# Patient Record
Sex: FEMALE | Race: WHITE | NOT HISPANIC OR LATINO | Employment: FULL TIME | URBAN - METROPOLITAN AREA
[De-identification: names, ages, dates, MRNs, and addresses within clinical notes are randomized per-mention and may not be internally consistent; named-entity substitution may affect disease eponyms.]

---

## 2019-06-19 ENCOUNTER — OFFICE VISIT CONVERTED (OUTPATIENT)
Dept: FAMILY MEDICINE CLINIC | Facility: CLINIC | Age: 24
End: 2019-06-19
Attending: NURSE PRACTITIONER

## 2019-06-25 ENCOUNTER — HOSPITAL ENCOUNTER (OUTPATIENT)
Dept: LAB | Facility: HOSPITAL | Age: 24
Discharge: HOME | End: 2019-06-25
Attending: NURSE PRACTITIONER

## 2019-06-25 LAB
ALBUMIN SERPL-MCNC: 4.8 G/DL (ref 3.5–5)
ALBUMIN/GLOB SERPL: 1.7 {RATIO} (ref 1.4–2.6)
ALP SERPL-CCNC: 60 U/L (ref 42–98)
ALT SERPL-CCNC: 11 U/L (ref 10–40)
ANION GAP SERPL CALC-SCNC: 16 MMOL/L (ref 8–19)
AST SERPL-CCNC: 17 U/L (ref 15–50)
BASOPHILS # BLD AUTO: 0.06 10*3/UL (ref 0–0.2)
BASOPHILS NFR BLD AUTO: 0.6 % (ref 0–3)
BILIRUB SERPL-MCNC: 0.6 MG/DL (ref 0.2–1.3)
BUN SERPL-MCNC: 11 MG/DL (ref 5–25)
BUN/CREAT SERPL: 11 {RATIO} (ref 6–20)
CALCIUM SERPL-MCNC: 9.2 MG/DL (ref 8.7–10.4)
CHLORIDE SERPL-SCNC: 104 MMOL/L (ref 99–111)
CHOLEST SERPL-MCNC: 170 MG/DL (ref 107–200)
CHOLEST/HDLC SERPL: 3.2 {RATIO} (ref 3–6)
CONV ABS IMM GRAN: 0.02 10*3/UL (ref 0–0.2)
CONV CO2: 24 MMOL/L (ref 22–32)
CONV IMMATURE GRAN: 0.2 % (ref 0–1.8)
CONV TOTAL PROTEIN: 7.6 G/DL (ref 6.3–8.2)
CREAT UR-MCNC: 1.01 MG/DL (ref 0.5–0.9)
DEPRECATED RDW RBC AUTO: 39.8 FL (ref 36.4–46.3)
EOSINOPHIL # BLD AUTO: 0.08 10*3/UL (ref 0–0.7)
EOSINOPHIL # BLD AUTO: 0.8 % (ref 0–7)
ERYTHROCYTE [DISTWIDTH] IN BLOOD BY AUTOMATED COUNT: 12.1 % (ref 11.7–14.4)
GFR SERPLBLD BASED ON 1.73 SQ M-ARVRAT: >60 ML/MIN/{1.73_M2}
GLOBULIN UR ELPH-MCNC: 2.8 G/DL (ref 2–3.5)
GLUCOSE SERPL-MCNC: 84 MG/DL (ref 65–99)
HBA1C MFR BLD: 14.3 G/DL (ref 12–16)
HCT VFR BLD AUTO: 42.9 % (ref 37–47)
HDLC SERPL-MCNC: 53 MG/DL (ref 40–60)
LDLC SERPL CALC-MCNC: 102 MG/DL (ref 70–100)
LYMPHOCYTES # BLD AUTO: 1.9 10*3/UL (ref 1–5)
MCH RBC QN AUTO: 30.2 PG (ref 27–31)
MCHC RBC AUTO-ENTMCNC: 33.3 G/DL (ref 33–37)
MCV RBC AUTO: 90.5 FL (ref 81–99)
MONOCYTES # BLD AUTO: 0.63 10*3/UL (ref 0.2–1.2)
MONOCYTES NFR BLD AUTO: 6.5 % (ref 3–10)
NEUTROPHILS # BLD AUTO: 7.01 10*3/UL (ref 2–8)
NEUTROPHILS NFR BLD AUTO: 72.3 % (ref 30–85)
NRBC CBCN: 0 % (ref 0–0.7)
OSMOLALITY SERPL CALC.SUM OF ELEC: 289 MOSM/KG (ref 273–304)
PLATELET # BLD AUTO: 293 10*3/UL (ref 130–400)
PMV BLD AUTO: 11.2 FL (ref 9.4–12.3)
POTASSIUM SERPL-SCNC: 4.4 MMOL/L (ref 3.5–5.3)
RBC # BLD AUTO: 4.74 10*6/UL (ref 4.2–5.4)
SODIUM SERPL-SCNC: 140 MMOL/L (ref 135–147)
T4 FREE SERPL-MCNC: 1.3 NG/DL (ref 0.9–1.8)
TRIGL SERPL-MCNC: 73 MG/DL (ref 40–150)
TSH SERPL-ACNC: 2.38 M[IU]/L (ref 0.27–4.2)
VARIANT LYMPHS NFR BLD MANUAL: 19.6 % (ref 20–45)
VLDLC SERPL-MCNC: 15 MG/DL (ref 5–37)
WBC # BLD AUTO: 9.7 10*3/UL (ref 4.8–10.8)

## 2020-02-20 ENCOUNTER — HOSPITAL ENCOUNTER (OUTPATIENT)
Dept: URGENT CARE | Facility: CLINIC | Age: 25
Discharge: HOME | End: 2020-02-20
Attending: PHYSICIAN ASSISTANT

## 2020-02-22 LAB — BACTERIA SPEC AEROBE CULT: NORMAL

## 2020-07-10 ENCOUNTER — HOSPITAL ENCOUNTER (OUTPATIENT)
Dept: LABOR AND DELIVERY | Facility: HOSPITAL | Age: 25
Discharge: HOME | End: 2020-07-10
Attending: OBSTETRICS & GYNECOLOGY

## 2020-07-10 LAB
ALBUMIN SERPL-MCNC: 3.6 G/DL (ref 3.5–5)
ALBUMIN/GLOB SERPL: 1.5 {RATIO} (ref 1.4–2.6)
ALP SERPL-CCNC: 66 U/L (ref 42–98)
ALT SERPL-CCNC: 24 U/L (ref 10–40)
ANION GAP SERPL CALC-SCNC: 15 MMOL/L (ref 8–19)
AST SERPL-CCNC: 21 U/L (ref 15–50)
BASOPHILS # BLD AUTO: 0.04 10*3/UL (ref 0–0.2)
BASOPHILS NFR BLD AUTO: 0.3 % (ref 0–3)
BILIRUB SERPL-MCNC: 0.25 MG/DL (ref 0.2–1.3)
BUN SERPL-MCNC: 9 MG/DL (ref 5–25)
BUN/CREAT SERPL: 12 {RATIO} (ref 6–20)
CALCIUM SERPL-MCNC: 8.8 MG/DL (ref 8.7–10.4)
CHLORIDE SERPL-SCNC: 108 MMOL/L (ref 99–111)
CONV ABS IMM GRAN: 0.06 10*3/UL (ref 0–0.2)
CONV CO2: 17 MMOL/L (ref 22–32)
CONV CREATININE URINE, RANDOM: 162 MG/DL (ref 10–300)
CONV IMMATURE GRAN: 0.5 % (ref 0–1.8)
CONV PROTEIN TO CREATININE RATIO (RANDOM URINE): 0.41 {RATIO} (ref 0–0.1)
CONV TOTAL PROTEIN: 6 G/DL (ref 6.3–8.2)
CREAT UR-MCNC: 0.73 MG/DL (ref 0.5–0.9)
DEPRECATED RDW RBC AUTO: 41.6 FL (ref 36.4–46.3)
EOSINOPHIL # BLD AUTO: 0.07 10*3/UL (ref 0–0.7)
EOSINOPHIL # BLD AUTO: 0.6 % (ref 0–7)
ERYTHROCYTE [DISTWIDTH] IN BLOOD BY AUTOMATED COUNT: 12.6 % (ref 11.7–14.4)
GFR SERPLBLD BASED ON 1.73 SQ M-ARVRAT: >60 ML/MIN/{1.73_M2}
GLOBULIN UR ELPH-MCNC: 2.4 G/DL (ref 2–3.5)
GLUCOSE SERPL-MCNC: 73 MG/DL (ref 65–99)
HCT VFR BLD AUTO: 38.5 % (ref 37–47)
HGB BLD-MCNC: 12.7 G/DL (ref 12–16)
LYMPHOCYTES # BLD AUTO: 1.98 10*3/UL (ref 1–5)
LYMPHOCYTES NFR BLD AUTO: 16 % (ref 20–45)
MCH RBC QN AUTO: 29.5 PG (ref 27–31)
MCHC RBC AUTO-ENTMCNC: 33 G/DL (ref 33–37)
MCV RBC AUTO: 89.3 FL (ref 81–99)
MONOCYTES # BLD AUTO: 0.78 10*3/UL (ref 0.2–1.2)
MONOCYTES NFR BLD AUTO: 6.3 % (ref 3–10)
NEUTROPHILS # BLD AUTO: 9.45 10*3/UL (ref 2–8)
NEUTROPHILS NFR BLD AUTO: 76.3 % (ref 30–85)
NRBC CBCN: 0 % (ref 0–0.7)
OSMOLALITY SERPL CALC.SUM OF ELEC: 279 MOSM/KG (ref 273–304)
PLATELET # BLD AUTO: 181 10*3/UL (ref 130–400)
PMV BLD AUTO: 12.2 FL (ref 9.4–12.3)
POTASSIUM SERPL-SCNC: 3.7 MMOL/L (ref 3.5–5.3)
PROT UR-MCNC: 66.3 MG/DL
RBC # BLD AUTO: 4.31 10*6/UL (ref 4.2–5.4)
SODIUM SERPL-SCNC: 136 MMOL/L (ref 135–147)
WBC # BLD AUTO: 12.38 10*3/UL (ref 4.8–10.8)

## 2020-10-15 LAB — HM PAP SMEAR: NORMAL

## 2020-12-09 ENCOUNTER — OFFICE VISIT CONVERTED (OUTPATIENT)
Dept: FAMILY MEDICINE CLINIC | Facility: CLINIC | Age: 25
End: 2020-12-09
Attending: NURSE PRACTITIONER

## 2021-01-19 ENCOUNTER — HOSPITAL ENCOUNTER (OUTPATIENT)
Dept: LAB | Facility: HOSPITAL | Age: 26
Discharge: HOME | End: 2021-01-19
Attending: NURSE PRACTITIONER

## 2021-01-19 ENCOUNTER — OFFICE VISIT CONVERTED (OUTPATIENT)
Dept: FAMILY MEDICINE CLINIC | Facility: CLINIC | Age: 26
End: 2021-01-19
Attending: NURSE PRACTITIONER

## 2021-01-19 LAB
ALBUMIN SERPL-MCNC: 4.7 G/DL (ref 3.5–5)
ALBUMIN/GLOB SERPL: 1.6 {RATIO} (ref 1.4–2.6)
ALP SERPL-CCNC: 75 U/L (ref 42–98)
ALT SERPL-CCNC: 10 U/L (ref 10–40)
ANION GAP SERPL CALC-SCNC: 15 MMOL/L (ref 8–19)
AST SERPL-CCNC: 15 U/L (ref 15–50)
BASOPHILS # BLD AUTO: 0.06 10*3/UL (ref 0–0.2)
BASOPHILS NFR BLD AUTO: 0.6 % (ref 0–3)
BILIRUB SERPL-MCNC: 0.47 MG/DL (ref 0.2–1.3)
BUN SERPL-MCNC: 13 MG/DL (ref 5–25)
BUN/CREAT SERPL: 14 {RATIO} (ref 6–20)
CALCIUM SERPL-MCNC: 9.6 MG/DL (ref 8.7–10.4)
CHLORIDE SERPL-SCNC: 103 MMOL/L (ref 99–111)
CONV ABS IMM GRAN: 0.03 10*3/UL (ref 0–0.2)
CONV CO2: 23 MMOL/L (ref 22–32)
CONV IMMATURE GRAN: 0.3 % (ref 0–1.8)
CONV TOTAL PROTEIN: 7.7 G/DL (ref 6.3–8.2)
CREAT UR-MCNC: 0.92 MG/DL (ref 0.5–0.9)
DEPRECATED RDW RBC AUTO: 38.7 FL (ref 36.4–46.3)
EOSINOPHIL # BLD AUTO: 0.1 10*3/UL (ref 0–0.7)
EOSINOPHIL # BLD AUTO: 1 % (ref 0–7)
ERYTHROCYTE [DISTWIDTH] IN BLOOD BY AUTOMATED COUNT: 12 % (ref 11.7–14.4)
GFR SERPLBLD BASED ON 1.73 SQ M-ARVRAT: >60 ML/MIN/{1.73_M2}
GLOBULIN UR ELPH-MCNC: 3 G/DL (ref 2–3.5)
GLUCOSE SERPL-MCNC: 87 MG/DL (ref 65–99)
HCT VFR BLD AUTO: 42.9 % (ref 37–47)
HGB BLD-MCNC: 14.1 G/DL (ref 12–16)
IRON SATN MFR SERPL: 29 % (ref 20–55)
IRON SERPL-MCNC: 108 UG/DL (ref 60–170)
LYMPHOCYTES # BLD AUTO: 2.63 10*3/UL (ref 1–5)
LYMPHOCYTES NFR BLD AUTO: 25.7 % (ref 20–45)
MCH RBC QN AUTO: 29.1 PG (ref 27–31)
MCHC RBC AUTO-ENTMCNC: 32.9 G/DL (ref 33–37)
MCV RBC AUTO: 88.5 FL (ref 81–99)
MONOCYTES # BLD AUTO: 0.63 10*3/UL (ref 0.2–1.2)
MONOCYTES NFR BLD AUTO: 6.2 % (ref 3–10)
NEUTROPHILS # BLD AUTO: 6.77 10*3/UL (ref 2–8)
NEUTROPHILS NFR BLD AUTO: 66.2 % (ref 30–85)
NRBC CBCN: 0 % (ref 0–0.7)
OSMOLALITY SERPL CALC.SUM OF ELEC: 283 MOSM/KG (ref 273–304)
PLATELET # BLD AUTO: 286 10*3/UL (ref 130–400)
PMV BLD AUTO: 10.8 FL (ref 9.4–12.3)
POTASSIUM SERPL-SCNC: 3.8 MMOL/L (ref 3.5–5.3)
RBC # BLD AUTO: 4.85 10*6/UL (ref 4.2–5.4)
SODIUM SERPL-SCNC: 137 MMOL/L (ref 135–147)
TIBC SERPL-MCNC: 369 UG/DL (ref 245–450)
TRANSFERRIN SERPL-MCNC: 258 MG/DL (ref 250–380)
TSH SERPL-ACNC: 3.73 M[IU]/L (ref 0.27–4.2)
VIT B12 SERPL-MCNC: 308 PG/ML (ref 211–911)
WBC # BLD AUTO: 10.22 10*3/UL (ref 4.8–10.8)

## 2021-04-27 ENCOUNTER — OFFICE VISIT CONVERTED (OUTPATIENT)
Dept: FAMILY MEDICINE CLINIC | Facility: CLINIC | Age: 26
End: 2021-04-27
Attending: NURSE PRACTITIONER

## 2021-04-30 ENCOUNTER — HOSPITAL ENCOUNTER (OUTPATIENT)
Dept: URGENT CARE | Facility: CLINIC | Age: 26
Discharge: HOME | End: 2021-04-30
Attending: NURSE PRACTITIONER

## 2021-05-03 LAB
BACTERIA UR CULT: ABNORMAL
CIPROFLOXACIN SUSC ISLT: <=0.5
DAPTOMYCIN SUSC ISLT: <=0.12
DOXYCYCLINE SUSC ISLT: <=0.5
ERYTHROMYCIN SUSC ISLT: >=8
GENTAMICIN SUSC ISLT: <=0.5
LEVOFLOXACIN SUSC ISLT: 1
NITROFURANTOIN SUSC ISLT: <=16
OXACILLIN SUSC ISLT: 2
RIFAMPIN SUSC ISLT: <=0.5
TETRACYCLINE SUSC ISLT: <=1
TIGECYCLINE SUSC ISLT: <=0.12
TMP SMX SUSC ISLT: <=10
VANCOMYCIN SUSC ISLT: <=0.5

## 2021-05-10 NOTE — H&P
History and Physical      Patient Name: Mery Sanchez   Patient ID: [id number]   Sex: Female   YOB: 1995    Primary Care Provider: Mejia NICOLE    Visit Date: January 19, 2021    Provider: BONNIE Romero   Location: [facility] [place]   Location Address: [address] [place] [address], [address]  [id number]   Location Phone: [phone]          Chief Complaint  · Establish Care  · Headaches with sharp pains in the back of her head      History Of Present Illness  Mery Sanchez is a 25 year old /White female who presents for evaluation and treatment of:      Presents to the office today to establish care.  Patient states that she has been having episodes of elevated blood pressure.  Blood pressure arrival today is 134/90.  She does state that she has been having frequent headaches as well as dizziness.  Patient states that the headaches occur on a daily basis.  She states that they are typically mild but has more severe ones approximately twice a week.  She denies any nausea vomiting.  She does state that she frequently has photophobia.  Patient currently right now is only using Tylenol and caffeine to help treat the aches.  Also states that she has been dealing with mild anxiety since the birth of her child 6 months ago.  Patient states that her child was born 3 months early and it being her first child, she finds her self getting anxious on regular basis.  Patient states that she is currently seeing a GYN in [facility].  She does state its been a while since she has had routine lab work.           Past Medical History  Disease Name Date Onset Notes   ***No Significant Medical History --  --          Past Surgical History  Procedure Name Date Notes   MENISCUS TEAR 2008, 2010 Right    Rotator Cuff repair 2012 Left         Allergy List  Allergen Name Date Reaction Notes   NO KNOWN DRUG ALLERGIES --  --  --          Family Medical History  Disease  Name Relative/Age Notes   Prostate cancer Grandfather (paternal)/   Grandfather (paternal)   Family history of breast cancer Grandmother (maternal)/40s  Mother/30s   Mother/30s; Grandmother (maternal)/40s   Family history of breast cancer in mother  --          Reproductive History  Menstrual   Age Menarche: 14   Pregnancy Summary   Total Pregnancies: 0 Full Term: 0 Premature: 0   Ab Induced: 0 Ab Spontaneous: 0 Ectopics: 0   Multiples: 0 Livin         Social History  Finding Status Start/Stop Quantity Notes   Alcohol Current some day --/-- --  drinks monthly; wine, beer and liquor   Caffeine Current some day --/-- --  drinks occasionally; tea; 1-2 times per day   Exercises 3 to 4 times per week --  --/-- --  --     --  --/-- --  --    Second hand smoke exposure Never --/-- --  no   Tobacco Never --/-- --  never a smoker         Immunizations  NameDate Admin Mfg Trade Name Lot Number Route Inj VIS Given VIS Publication   [id number]/01/2019 SKB Fluzone [employer]  NE NE 2019    Comments:          Review of Systems  · Constitutional  o Admits  o : fatigue  o Denies  o : night sweats  · Eyes  o Denies  o : double vision, blurred vision  · HENT  o Denies  o : vertigo, recent head injury  · Breasts  o Denies  o : abnormal changes in breast size, additional breast symptoms except as noted in the HPI  · Cardiovascular  o Denies  o : chest pain, irregular heart beats  · Respiratory  o Denies  o : shortness of breath, productive cough  · Gastrointestinal  o Denies  o : nausea, vomiting  · Genitourinary  o Denies  o : dysuria, urinary retention  · Integument  o Denies  o : hair growth change, new skin lesions  · Musculoskeletal  o Denies  o : joint swelling, limitation of motion  · Endocrine  o Denies  o : cold intolerance, heat intolerance  · Heme-Lymph  o Denies  o : petechiae, lymph node enlargement or tenderness  · Allergic-Immunologic  o Denies  o : frequent illnesses      Vitals  Date Time BP  "Position Site L\R Cuff Size HR RR TEMP (F) WT  HT  BMI kg/m2 BSA m2 O2 Sat FR L/min FiO2 HC       01/19/2021 10:39 /90 Sitting    103 - R  97.8 198lbs 0oz 5'  4\" 33.99 2.01 98 %            Physical Examination  · Constitutional  o Appearance  o : well-nourished, in no acute distress  · Neck  o Inspection/Palpation  o : normal appearance, no masses or tenderness, trachea midline  o Range of Motion  o : cervical range of motion within normal limits  o Thyroid  o : gland size normal, nontender, no nodules or masses present on palpation  · Respiratory  o Respiratory Effort  o : breathing unlabored  o Inspection of Chest  o : normal appearance  o Auscultation of Lungs  o : normal breath sounds throughout inspiration and expiration  · Cardiovascular  o Heart  o :   § Auscultation of Heart  § : regular rate and rhythm, no murmurs, gallops or rubs  o Peripheral Vascular System  o :   § Extremities  § : no clubbing or edema  · Skin and Subcutaneous Tissue  o General Inspection  o : no rashes or lesions present, no areas of discoloration  o Body Hair  o : hair normal for age, general body hair distribution normal for age  o Digits and Nails  o : no clubbing, cyanosis, deformities or edema present, normal appearing nails  · Neurologic  o Mental Status Examination  o :   § Orientation  § : grossly oriented to person, place and time  o Gait and Station  o : normal gait, able to stand without difficulty  · Psychiatric  o Judgement and Insight  o : judgment and insight intact  o Mood and Affect  o : mood normal, affect appropriate  o Presence of Abnormal Thoughts  o : no hallucinations, no delusions present, no psychotic thoughts          Assessment  · Anxiety disorder     300.00/F41.9  · Essential hypertension     401.9/I10  · Fatigue     780.79/R53.83  · Migraine     346.90/G43.909  · Establishing care with new doctor, encounter for     V65.8/Z76.89      Plan  · Orders  o Female Fatigue Panel (CBC, CMP, TSH, B12) [facility] " "(36391, 77662, 70615, 70677) - 780.79/R53.83 - 01/19/2021  o Iron panel (iron, TIBC, transferrin saturation) (92905, 99672, 35922) - 780.79/R53.83 - 01/19/2021  o ACO-39: Current medications updated and reviewed (, 1159F) - - 01/19/2021  · Medications  o propranolol 20 mg oral tablet   SIG: take 1 tablet by oral route 2 times a day for 90 days   DISP: (180) Tablet with 0 refills  Adjusted on 01/19/2021     o Medications have been Reconciled  o Transition of Care or Provider Policy  · Instructions  o Discussed the need for therapy, either with a certified counselor, psychologist, and/or family . If no improvement is noted or worsening of their condition, return to office or ER. But also discussed with patient that if they are non-responsive to the type of medication they may need to see a psychiatrist for further evaluation and management.  o Patient was given an SSRI/SSNRI medication and warned of possible side effects of the medication including potential for increased risk of suicidal thoughts and feelings. Patient was instructed that if they begin to exhibit any of these effects they will discontinue the medication immediately and contact our office or the ER ASAP.  o Patient agrees to a \"No Self Harm\" contract. Patient will either call us, [facility], ER, [facility], [facility].  o Patient advised to monitor blood pressure (B/P) at home and journal readings. Patient informed that a B/P reading at home of more than 130/80 is considered hypertension. For readings greater dqtd888/90 or higher patient is advised to follow up in the office with readings for management. Patient advised to limit sodium intake.  o Patient was educated/instructed on their diagnosis, treatment and medications prior to discharge from the clinic today.  o Minutes spent with patient including greater than 50% in Education/Counseling/Care Coordination.  o Time spent with the patient was minutes, more than " 50% face to face.  o Electronically Identified Patient Education Materials Provided Electronically  · Disposition  o Call or Return if symptoms worsen or persist.  o Follow up in 3 months            Electronically Signed by: BONNIE Romero -Author on January 19, 2021 12:56:12 PM

## 2021-05-13 NOTE — PROGRESS NOTES
Progress Note      Patient Name: Mery Sanchez   Patient ID: [id number]   Sex: Female   YOB: 1995    Primary Care Provider: Anjali NICOLE   Referring Provider: Anjali NICOLE    Visit Date: 2020    Provider: BONNIE Stafford   Location: [facility]   Location Address: [address] [place] [address], [address]  [place], [place]  [id number]   Location Phone: [phone]          Chief Complaint  · Blood pressure and headaches      History Of Present Illness  Mery Sanchez is a 25 year old /White female who presents for evaluation and treatment of:      Pt is having elevated blood pressure and HA. She did have blood pressure issues and preeclampsia with her last pregnancy. She declines depression but states she has a lot of anxiety.    Pt has pap smears at [facility] with Dr. Felix.       Past Medical History  Disease Name Date Onset Notes   ***No Significant Medical History --  --          Past Surgical History  Procedure Name Date Notes   MENISCUS TEAR ,  Right    Rotator Cuff repair  Left         Allergy List  Allergen Name Date Reaction Notes   NO KNOWN DRUG ALLERGIES --  --  --          Family Medical History  Disease Name Relative/Age Notes   Prostate cancer Grandfather (paternal)/   Grandfather (paternal)   Family history of breast cancer Grandmother (maternal)/40s  Mother/30s   Mother/30s; Grandmother (maternal)/40s   Family history of breast cancer in mother  --          Reproductive History  Menstrual   Age Menarche: 14   Pregnancy Summary   Total Pregnancies: 0 Full Term: 0 Premature: 0   Ab Induced: 0 Ab Spontaneous: 0 Ectopics: 0   Multiples: 0 Livin         Social History  Finding Status Start/Stop Quantity Notes   Alcohol Current some day --/-- --  drinks monthly; wine, beer and liquor   Caffeine Current some day --/-- --  drinks occasionally; tea; 1-2 times per day   Exercises 3 to 4 times per week --  --/-- --  --      "--  --/-- --  --    Second hand smoke exposure Never --/-- --  no   Tobacco Never --/-- --  never a smoker         Immunizations  NameDate Admin Mfg Trade Name Lot Number Route Inj VIS Given VIS Publication   [id number]/01/2019 SKB Fluzone [employer]  NE NE 06/19/2019    Comments:          Review of Systems  · Constitutional  o Denies  o : fever, fatigue, weight loss, weight gain  · HENT  o Admits  o : headaches  · Cardiovascular  o Denies  o : lower extremity edema, claudication, chest pressure, palpitations  · Respiratory  o Denies  o : shortness of breath, wheezing, cough, hemoptysis, dyspnea on exertion  · Gastrointestinal  o Denies  o : nausea, vomiting, diarrhea, constipation, abdominal pain  · Psychiatric  o Admits  o : anxiety      Vitals  Date Time BP Position Site L\R Cuff Size HR RR TEMP (F) WT  HT  BMI kg/m2 BSA m2 O2 Sat FR L/min FiO2 HC       06/19/2019 02:49 /81 Sitting    88 - R   189lbs 0oz 5'  6\" 30.51 2 98 %      12/09/2020 03:44 /99 Sitting    104 - R   201lbs 6oz 5'  4\" 34.57 2.03 98 %            Physical Examination  · Constitutional  o Appearance  o : well-nourished, well developed, alert, in no acute distress  · Respiratory  o Respiratory Effort  o : breathing unlabored  o Auscultation of Lungs  o : normal breath sounds throughout  · Cardiovascular  o Heart  o :   § Auscultation of Heart  § : regular rate and rhythm, no murmurs, gallops or rubs  § Palpation of Heart  § : normal apical impulse, no cardiac thrill present  o Peripheral Vascular System  o :   § Extremities  § : no cyanosis, clubbing or edema; less than 2 second refill noted  · Gastrointestinal  o Abdominal Examination  o : abdomen nontender to palpation, tone normal without rigidity or guarding, no masses present, abdominal contour scaphoid  o Liver and spleen  o : no hepatomegaly present, liver nontender to palpation, spleen not palpable  · Skin and Subcutaneous Tissue  o General Inspection  o : no rashes or " "lesions present, no areas of discoloration  · Neurologic  o Mental Status Examination  o :   § Orientation  § : grossly oriented to person, place and time  o Cranial Nerves  o : cranial nerves intact and symmetric throughout  · Psychiatric  o Mood and Affect  o : mood normal, affect appropriate, denies any SI/HI          Assessment  · Anxiety disorder     300.00/F41.9  · Essential hypertension     401.9/I10  · Headache     784.0/R51  · Screening for depression     V79.0/Z13.89    Problems Reconciled  Plan  · Orders  o ACO-18: Negative screen for clinical depression using a standardized tool () - V79.0/Z13.89 - 12/09/2020  o ACO-39: Current medications updated and reviewed (1159F, ) - - 12/09/2020  o ACO-18: Negative screen for clinical depression using a standardized tool () - - 12/09/2020  o ACO-14: Influenza immunization administered or previously received [facility] () - - 12/09/2020  · Medications  o benazepril 10 mg oral tablet   SIG: take 1 tablet (10 mg) by oral route once daily for 30 days   DISP: (30) Tablet with 1 refills  Prescribed on 12/09/2020     o Medications have been Reconciled  o Transition of Care or Provider Policy  · Instructions  o Patient agrees to a \"No Self Harm\" contract. Patient will either call , 1, , [facility], [facility].  o Patient advised to monitor blood pressure (B/P) at home and journal readings. Patient informed that a B/P reading at home of more than 130/80 is considered hypertension. For readings greater kufi533/90 or higher patient is advised to follow up in the office with readings for management. Patient advised to limit sodium intake.  o Depression Screen completed and scanned into the EMR under the designated folder within the patient's documents.  o Today's PHQ-9 result is _6: pt declines depression__  o The provider screening met the required time of 15 minutes.  o Patient was educated/instructed on their diagnosis, " treatment and medications prior to discharge from the clinic today.  o Patient instructed to seek medical attention urgently for new or worsening symptoms.  o Call the office with any concerns or questions.  o advised pt to keep bp log and bring to f/u appt  · Disposition  o Return Visit Request in/on 1 month +/- 2 weeks ([id number]).            Electronically Signed by: BONNIE Stafford -Author on December 9, 2020 03:57:31 PM

## 2021-05-14 VITALS
BODY MASS INDEX: 34.15 KG/M2 | SYSTOLIC BLOOD PRESSURE: 122 MMHG | TEMPERATURE: 97.8 F | HEIGHT: 64 IN | OXYGEN SATURATION: 98 % | WEIGHT: 200 LBS | HEART RATE: 90 BPM | DIASTOLIC BLOOD PRESSURE: 80 MMHG

## 2021-05-14 VITALS
SYSTOLIC BLOOD PRESSURE: 143 MMHG | HEIGHT: 64 IN | OXYGEN SATURATION: 98 % | HEART RATE: 104 BPM | DIASTOLIC BLOOD PRESSURE: 99 MMHG | BODY MASS INDEX: 34.38 KG/M2 | WEIGHT: 201.37 LBS

## 2021-05-14 VITALS
WEIGHT: 198 LBS | SYSTOLIC BLOOD PRESSURE: 134 MMHG | TEMPERATURE: 97.8 F | DIASTOLIC BLOOD PRESSURE: 90 MMHG | HEIGHT: 64 IN | OXYGEN SATURATION: 98 % | BODY MASS INDEX: 33.8 KG/M2 | HEART RATE: 103 BPM

## 2021-05-14 NOTE — PROGRESS NOTES
Progress Note      Patient Name: Mery Sanchez   Patient ID: [id number]   Sex: Female   YOB: 1995    Primary Care Provider: Mejia NICOLE    Visit Date: April 27, 2021    Provider: BONNIE Romero   Location: [facility]   Location Address: [address] [place] [address], [address]  [id number]   Location Phone: [phone]          Chief Complaint  · 3 month follow up       History Of Present Illness  Mery Sanchez is a 26 year old /White female who presents for evaluation and treatment of:      Presents to the office today for 3-month follow-up regarding her migraine headaches.  Patient states that she continues to have these on a daily basis and states that she has severe migraines proximately 2-3 times a week.  She does state that the current medication regimen has lessened the severity of the headaches states that she is still having them.  He also states that she has mild nausea and vomiting occasionally.  Patient states that she has noticed some mild paresthesias in her hands with the topiramate.  She states it is not bothersome but just wanted to let the office know.  I explained to the patient that the side effects become more severe to let us know and we would consider switching the medication to Trokendi that would be less likely to cause side effects       Past Medical History  Disease Name Date Onset Notes   ***No Significant Medical History --  --          Past Surgical History  Procedure Name Date Notes   MENISCUS TEAR 2008, 2010 Right    Rotator Cuff repair 2012 Left         Medication List  Name Date Started Instructions   Imitrex 100 mg oral tablet 03/31/2021 take 1 tablet (100 mg) by oral route after onset of migraine; may repeat after 2 hours PRN not to exceed 200mg in 24hrs   propranolol 20 mg oral tablet 01/19/2021 take 1 tablet by oral route 2 times a day for 90 days   topiramate 25 mg oral tablet 03/31/2021 take 1 tablet by  "oral route 2 times per day in the morning and evening for 30 days         Allergy List  Allergen Name Date Reaction Notes   NO KNOWN DRUG ALLERGIES --  --  --        Allergies Reconciled  Family Medical History  Disease Name Relative/Age Notes   Prostate cancer Grandfather (paternal)/   Grandfather (paternal)   Family history of breast cancer Grandmother (maternal)/40s  Mother/30s   Mother/30s; Grandmother (maternal)/40s   Family history of breast cancer in mother  --          Reproductive History  Menstrual   Age Menarche: 14   Pregnancy Summary   Total Pregnancies: 0 Full Term: 0 Premature: 0   Ab Induced: 0 Ab Spontaneous: 0 Ectopics: 0   Multiples: 0 Livin         Social History  Finding Status Start/Stop Quantity Notes   Alcohol Current some day --/-- --  drinks monthly; wine, beer and liquor   Caffeine Current some day --/-- --  drinks occasionally; tea; 1-2 times per day   Exercises 3 to 4 times per week --  --/-- --  --     --  --/-- --  --    Second hand smoke exposure Never --/-- --  no   Tobacco Never --/-- --  never a smoker         Immunizations  NameDate Admin Mfg Trade Name Lot Number Route Inj VIS Given VIS Publication   [id number]/01/2019 SKB Fluzone [employer]  NE NE 2019    Comments:          Review of Systems  · Constitutional  o Denies  o : fever, fatigue, weight loss, weight gain  · Cardiovascular  o Denies  o : lower extremity edema, claudication, chest pressure, palpitations  · Respiratory  o Denies  o : shortness of breath, wheezing, cough, hemoptysis, dyspnea on exertion  · Gastrointestinal  o Denies  o : nausea, vomiting, diarrhea, constipation, abdominal pain      Vitals  Date Time BP Position Site L\R Cuff Size HR RR TEMP (F) WT  HT  BMI kg/m2 BSA m2 O2 Sat FR L/min FiO2 HC       2021 07:31 /80 Sitting    90 - R  97.8 200lbs 0oz 5'  4\" 34.33 2.02 98 %            Physical Examination  · Constitutional  o Appearance  o : well-nourished, in no acute " distress  · Neck  o Inspection/Palpation  o : normal appearance, no masses or tenderness, trachea midline  o Range of Motion  o : cervical range of motion within normal limits  o Thyroid  o : gland size normal, nontender, no nodules or masses present on palpation  · Respiratory  o Respiratory Effort  o : breathing unlabored  o Inspection of Chest  o : normal appearance  o Auscultation of Lungs  o : normal breath sounds throughout inspiration and expiration  · Cardiovascular  o Heart  o :   § Auscultation of Heart  § : regular rate and rhythm, no murmurs, gallops or rubs  o Peripheral Vascular System  o :   § Extremities  § : no clubbing or edema  · Skin and Subcutaneous Tissue  o General Inspection  o : no rashes or lesions present, no areas of discoloration  o Body Hair  o : hair normal for age, general body hair distribution normal for age  o Digits and Nails  o : no clubbing, cyanosis, deformities or edema present, normal appearing nails  · Neurologic  o Mental Status Examination  o :   § Orientation  § : grossly oriented to person, place and time  o Gait and Station  o : normal gait, able to stand without difficulty  · Psychiatric  o Judgement and Insight  o : judgment and insight intact  o Mood and Affect  o : mood normal, affect appropriate  o Presence of Abnormal Thoughts  o : no hallucinations, no delusions present, no psychotic thoughts          Assessment  · Migraine     346.90/G43.909  · Medication monitoring encounter     V58.83/Z51.81      Plan  · Orders  o ACO-39: Current medications updated and reviewed (, 1159F) - - 04/27/2021  · Medications  o topiramate 50 mg oral tablet   SIG: take 1 tablet by oral route 2 times a day for 30 days   DISP: (60) Tablet with 2 refills  Adjusted on 04/27/2021     o Medications have been Reconciled  o Transition of Care or Provider Policy  · Instructions  o Patient was educated/instructed on their diagnosis, treatment and medications prior to discharge from the  clinic today.  o Minutes spent with patient including greater than 50% in Education/Counseling/Care Coordination.  o Time spent with the patient was minutes, more than 50% face to face.  o Electronically Identified Patient Education Materials Provided Electronically  · Disposition  o Call or Return if symptoms worsen or persist.  o Follow up in 3 months            Electronically Signed by: BONNIE Romero -Author on April 27, 2021 07:45:19 AM

## 2021-05-15 VITALS
BODY MASS INDEX: 30.37 KG/M2 | HEIGHT: 66 IN | WEIGHT: 189 LBS | SYSTOLIC BLOOD PRESSURE: 129 MMHG | DIASTOLIC BLOOD PRESSURE: 81 MMHG | HEART RATE: 88 BPM | OXYGEN SATURATION: 98 %

## 2021-07-28 ENCOUNTER — OFFICE VISIT (OUTPATIENT)
Dept: FAMILY MEDICINE CLINIC | Facility: CLINIC | Age: 26
End: 2021-07-28

## 2021-07-28 VITALS
BODY MASS INDEX: 34.31 KG/M2 | HEART RATE: 99 BPM | HEIGHT: 64 IN | WEIGHT: 201 LBS | OXYGEN SATURATION: 99 % | SYSTOLIC BLOOD PRESSURE: 126 MMHG | DIASTOLIC BLOOD PRESSURE: 80 MMHG | TEMPERATURE: 97.5 F

## 2021-07-28 DIAGNOSIS — G43.911 INTRACTABLE MIGRAINE WITH STATUS MIGRAINOSUS, UNSPECIFIED MIGRAINE TYPE: ICD-10-CM

## 2021-07-28 DIAGNOSIS — R53.83 FATIGUE, UNSPECIFIED TYPE: ICD-10-CM

## 2021-07-28 DIAGNOSIS — Z80.3 FAMILY HISTORY OF BREAST CANCER IN MOTHER: Primary | ICD-10-CM

## 2021-07-28 DIAGNOSIS — N63.0 BREAST NODULE: ICD-10-CM

## 2021-07-28 PROCEDURE — 99213 OFFICE O/P EST LOW 20 MIN: CPT | Performed by: NURSE PRACTITIONER

## 2021-07-28 RX ORDER — SUMATRIPTAN 100 MG/1
TABLET, FILM COATED ORAL
COMMUNITY
Start: 2021-05-01 | End: 2021-08-10 | Stop reason: ALTCHOICE

## 2021-07-28 NOTE — PROGRESS NOTES
"Chief Complaint  Follow-up (Headaches) and Fatigue    Subjective          Mery Sanchez presents to [facility]  Patient presents to the office today to discuss her migraines.  Patient was taken off topiramate and placed on Trokendi due to unfavorable side effects.  Patient states that her headaches have seemed to got worse.  I did discuss starting patient on Nurtec.  Samples were given to the patient to start.  Patient also has concerns of breast nodules in her left breast.  Patient does state that her mom was diagnosed with breast cancer in her 30s.  He states that the nodule is very tender.  She does state that she has noticed some nipple discharge although she discontinued breast-feeding a year ago.  She denies any changes in her skin or dimpling of the breast.      Objective   Vital Signs:   /80 (BP Location: Left arm, Patient Position: Sitting, Cuff Size: Adult)   Pulse 99   Temp 97.5 °F (36.4 °C) (Temporal)   Ht 162.6 cm (64\")   Wt 91.2 kg (201 lb)   SpO2 99%   BMI 34.50 kg/m²     Physical Exam  Exam conducted with a chaperone present.   Constitutional:       Appearance: Normal appearance.   Cardiovascular:      Rate and Rhythm: Normal rate and regular rhythm.      Heart sounds: Normal heart sounds, S1 normal and S2 normal. No murmur heard.     Pulmonary:      Effort: Pulmonary effort is normal. No respiratory distress.      Breath sounds: Normal breath sounds.   Chest:      Breasts:         Right: Normal.         Left: Mass and tenderness present. No swelling, inverted nipple, nipple discharge or skin change.       Lymphadenopathy:      Upper Body:      Right upper body: No supraclavicular or axillary adenopathy.      Left upper body: No supraclavicular or axillary adenopathy.   Skin:     General: Skin is warm and dry.   Neurological:      Mental Status: She is alert and oriented to person, place, and time.   Psychiatric:         Attention and Perception: " Attention normal.         Mood and Affect: Mood normal.         Behavior: Behavior normal.        Result Review :                Assessment and Plan    Diagnoses and all orders for this visit:    1. Family history of breast cancer in mother (Primary)  -     Mammo Screening Digital Tomosynthesis Bilateral With CAD; Future    2. Breast nodule  Comments:  left breast 11 oclock   Orders:  -     Mammo Screening Digital Tomosynthesis Bilateral With CAD; Future    3. Intractable migraine with status migrainosus, unspecified migraine type  Comments:  Nurtec 75 mg p.o. daily as needed.    4. Fatigue, unspecified type        Follow Up   No follow-ups on file.  Patient was given instructions and counseling regarding her condition or for health maintenance advice. Please see specific information pulled into the AVS if appropriate.

## 2021-08-03 ENCOUNTER — TELEPHONE (OUTPATIENT)
Dept: FAMILY MEDICINE CLINIC | Facility: CLINIC | Age: 26
End: 2021-08-03

## 2021-08-03 NOTE — TELEPHONE ENCOUNTER
Pt went in for her mammogram today and was turned away because the order was wrong. Order placed was for a screening mammo, but pt was told that it needed to be a diagnostic mammo with U/S. Pt would like the new order placed so that she can get scheduled as soon as possible.

## 2021-08-09 ENCOUNTER — TELEPHONE (OUTPATIENT)
Dept: FAMILY MEDICINE CLINIC | Facility: CLINIC | Age: 26
End: 2021-08-09

## 2021-08-09 NOTE — TELEPHONE ENCOUNTER
Caller: Mery Sanchez    Relationship: Self    Best call back number:[phone]     What medication are you requesting:NORTIC      Have you had these symptoms before:    [x] Yes  [] No    Have you been treated for these symptoms before:   [x] Yes  [] No    If a prescription is needed, what is your preferred pharmacy and phone number:    [facility] [address], [place] - [address] [facility] AT [facility] [address]  [phone]        Additional notes:PATIENT IS ALSO SUPPOSE TO HAVE A MAMMOGRAM DONE AND WOULD LIKE AN APPOINTMENT SCHEDULED. PLEASE ADVISE PATIENT WHEN COMPLETED.

## 2021-08-10 RX ORDER — RIMEGEPANT SULFATE 75 MG/75MG
75 TABLET, ORALLY DISINTEGRATING ORAL DAILY PRN
Qty: 24 TABLET | Refills: 1 | Status: SHIPPED | OUTPATIENT
Start: 2021-08-10 | End: 2021-09-22

## 2021-08-10 NOTE — TELEPHONE ENCOUNTER
Spoke to pt and she is wanting to start nurtec,  She says you gave her samples and they worked really well.

## 2021-08-16 ENCOUNTER — TELEPHONE (OUTPATIENT)
Dept: FAMILY MEDICINE CLINIC | Facility: CLINIC | Age: 26
End: 2021-08-16

## 2021-08-16 DIAGNOSIS — Z12.31 BREAST CANCER SCREENING BY MAMMOGRAM: Primary | ICD-10-CM

## 2021-08-16 NOTE — TELEPHONE ENCOUNTER
PT CALLED SAYING THAT SHE NEEDS AN ORDER FOR A DIAGNOSTIC MAMMOGRAM ORDER. PT ALSO NEEDING ORDER SAYING US IF NEEDED. SCHEDULING HUB WAITING ON ORDER CHANGE SO THAT THEY CAN SCHEDULE THE PT. THANK YOU

## 2021-08-17 ENCOUNTER — TRANSCRIBE ORDERS (OUTPATIENT)
Dept: ADMINISTRATIVE | Facility: HOSPITAL | Age: 26
End: 2021-08-17

## 2021-08-17 DIAGNOSIS — Z12.39 ENCOUNTER FOR SCREENING FOR MALIGNANT NEOPLASM OF BREAST, UNSPECIFIED SCREENING MODALITY: Primary | ICD-10-CM

## 2021-09-02 ENCOUNTER — HOSPITAL ENCOUNTER (OUTPATIENT)
Dept: ULTRASOUND IMAGING | Facility: HOSPITAL | Age: 26
Discharge: HOME | End: 2021-09-02
Admitting: NURSE PRACTITIONER

## 2021-09-02 DIAGNOSIS — N64.4 BREAST PAIN, LEFT: ICD-10-CM

## 2021-09-02 DIAGNOSIS — Z12.31 BREAST CANCER SCREENING BY MAMMOGRAM: ICD-10-CM

## 2021-09-02 PROCEDURE — 76642 ULTRASOUND BREAST LIMITED: CPT

## 2021-09-22 ENCOUNTER — PROCEDURE VISIT (OUTPATIENT)
Dept: OBSTETRICS AND GYNECOLOGY | Facility: CLINIC | Age: 26
End: 2021-09-22

## 2021-09-22 VITALS — SYSTOLIC BLOOD PRESSURE: 125 MMHG | WEIGHT: 201 LBS | DIASTOLIC BLOOD PRESSURE: 86 MMHG | BODY MASS INDEX: 34.5 KG/M2

## 2021-09-22 DIAGNOSIS — Z31.69 ENCOUNTER FOR PRECONCEPTION CONSULTATION: Primary | ICD-10-CM

## 2021-09-22 DIAGNOSIS — Z30.432 ENCOUNTER FOR REMOVAL OF INTRAUTERINE CONTRACEPTIVE DEVICE (IUD): ICD-10-CM

## 2021-09-22 PROCEDURE — 58301 REMOVE INTRAUTERINE DEVICE: CPT | Performed by: OBSTETRICS & GYNECOLOGY

## 2021-09-22 PROCEDURE — 99213 OFFICE O/P EST LOW 20 MIN: CPT | Performed by: OBSTETRICS & GYNECOLOGY

## 2021-09-22 RX ORDER — RIMEGEPANT SULFATE 75 MG/75MG
TABLET, ORALLY DISINTEGRATING ORAL
Qty: 16 TABLET | Refills: 0 | OUTPATIENT
Start: 2021-09-22 | End: 2022-04-05

## 2021-09-22 RX ORDER — CHOLECALCIFEROL (VITAMIN D3) 25 MCG
1 TABLET,CHEWABLE ORAL DAILY
Qty: 90 CAPSULE | Refills: 4 | Status: SHIPPED | OUTPATIENT
Start: 2021-09-22

## 2021-09-22 NOTE — PROGRESS NOTES
Encounter Provider:  Jeremie Felix Sr., MD  [facility] of Service[facility]  Patient Name: Mery Sanchez  :  1995    Subjective   Patient ID: Mery Sanchez is a 26 y.o. female    Desires IUD removal  Also desires future pregnancy.  Prior pregnancy was complicated with preeclampsia, abruption, delivered at 27 to 28 weeks by .    Objective    Physical Exam  Vitals and nursing note reviewed. Exam conducted with a chaperone present.   Constitutional:       Appearance: Normal appearance.   Neck:      Thyroid: No thyroid mass or thyromegaly.   Cardiovascular:      Rate and Rhythm: Regular rhythm.      Heart sounds: No murmur heard.   Pulmonary:      Effort: Pulmonary effort is normal.      Breath sounds: No wheezing.   Abdominal:      General: There is no distension.      Palpations: Abdomen is soft. There is no mass.      Tenderness: There is no abdominal tenderness.   Skin:     General: Skin is warm and dry.   Neurological:      Mental Status: She is alert and oriented to person, place, and time.   Psychiatric:         Mood and Affect: Mood normal.         Behavior: Behavior normal.         Thought Content: Thought content normal    Procedure:  Speculum placed cervix visualized.   IUD string visualized.  IUD removed.  IUD intact, shown to patient, discarded.  Copper IUD removed    Assessment/Plan     Diagnoses and all orders for this visit:    1. Encounter for preconception consultation (Primary)  -     Prenatal Vit-Fe Sulfate-FA-DHA (Prenatal Vitamin/Min +DHA) 27-0.8-200 MG capsule; Take 1 tablet by mouth Daily.  Dispense: 90 capsule; Refill: 4    2. Encounter for removal of intrauterine contraceptive device (IUD)        26-year-old  desires future pregnancy  IUD removal today.  Discussed prior preeclampsia at third trimester.  Need baby aspirin at the end of first trimester for preeclampsia prevention with next pregnancy  Encourage prenatal vitamins  Prenatal  vitamins E prescribed to pharmacy           Jeremie Felix Sr., MD  9/22/2021  09:41 EDT

## 2022-12-02 ENCOUNTER — TRANSCRIBE ORDERS (OUTPATIENT)
Dept: LAB | Facility: HOSPITAL | Age: 27
End: 2022-12-02

## 2022-12-02 ENCOUNTER — LAB (OUTPATIENT)
Dept: LAB | Facility: HOSPITAL | Age: 27
End: 2022-12-02

## 2022-12-02 DIAGNOSIS — O20.0 THREATENED ABORTION, ANTEPARTUM: ICD-10-CM

## 2022-12-02 DIAGNOSIS — O20.0 THREATENED ABORTION, ANTEPARTUM: Primary | ICD-10-CM

## 2022-12-02 PROCEDURE — 36415 COLL VENOUS BLD VENIPUNCTURE: CPT

## 2022-12-02 PROCEDURE — 84702 CHORIONIC GONADOTROPIN TEST: CPT

## 2022-12-04 LAB — HCG INTACT+B SERPL-ACNC: 338 MIU/ML

## 2022-12-13 ENCOUNTER — TRANSCRIBE ORDERS (OUTPATIENT)
Dept: LAB | Facility: HOSPITAL | Age: 27
End: 2022-12-13

## 2022-12-13 DIAGNOSIS — O03.9 SPONTANEOUS ABORTION WITHOUT COMPLICATION: Primary | ICD-10-CM

## 2022-12-14 ENCOUNTER — LAB (OUTPATIENT)
Dept: LAB | Facility: HOSPITAL | Age: 27
End: 2022-12-14

## 2022-12-14 DIAGNOSIS — O03.9 SPONTANEOUS ABORTION WITHOUT COMPLICATION: ICD-10-CM

## 2022-12-14 LAB — HCG INTACT+B SERPL-ACNC: 15.9 MIU/ML

## 2022-12-14 PROCEDURE — 84702 CHORIONIC GONADOTROPIN TEST: CPT

## 2022-12-14 PROCEDURE — 36415 COLL VENOUS BLD VENIPUNCTURE: CPT

## 2022-12-16 ENCOUNTER — TRANSCRIBE ORDERS (OUTPATIENT)
Dept: LAB | Facility: HOSPITAL | Age: 27
End: 2022-12-16

## 2022-12-16 DIAGNOSIS — O03.9 SPONTANEOUS ABORTION WITHOUT COMPLICATION: Primary | ICD-10-CM

## 2025-08-26 ENCOUNTER — PATIENT ROUNDING (BHMG ONLY) (OUTPATIENT)
Dept: URGENT CARE | Facility: CLINIC | Age: 30
End: 2025-08-26
Payer: COMMERCIAL